# Patient Record
Sex: MALE | URBAN - METROPOLITAN AREA
[De-identification: names, ages, dates, MRNs, and addresses within clinical notes are randomized per-mention and may not be internally consistent; named-entity substitution may affect disease eponyms.]

---

## 2022-08-22 ENCOUNTER — APPOINTMENT (RX ONLY)
Dept: URBAN - METROPOLITAN AREA CLINIC 176 | Facility: CLINIC | Age: 82
Setting detail: DERMATOLOGY
End: 2022-08-22

## 2022-08-22 VITALS — HEIGHT: 71 IN | WEIGHT: 190 LBS

## 2022-08-22 DIAGNOSIS — L43.8 OTHER LICHEN PLANUS: ICD-10-CM

## 2022-08-22 DIAGNOSIS — Z72.0 TOBACCO USE: ICD-10-CM

## 2022-08-22 DIAGNOSIS — D69.2 OTHER NONTHROMBOCYTOPENIC PURPURA: ICD-10-CM

## 2022-08-22 PROCEDURE — ? SMOKING CESSATION COUNSELING

## 2022-08-22 PROCEDURE — 99205 OFFICE O/P NEW HI 60 MIN: CPT

## 2022-08-22 PROCEDURE — ? COUNSELING

## 2022-08-22 ASSESSMENT — LOCATION DETAILED DESCRIPTION DERM
LOCATION DETAILED: LEFT PROXIMAL PRETIBIAL REGION
LOCATION DETAILED: LEFT DISTAL DORSAL FOREARM
LOCATION DETAILED: LEFT DISTAL PRETIBIAL REGION
LOCATION DETAILED: RIGHT PROXIMAL DORSAL FOREARM
LOCATION DETAILED: RIGHT PROXIMAL PRETIBIAL REGION
LOCATION DETAILED: RIGHT DISTAL PRETIBIAL REGION

## 2022-08-22 ASSESSMENT — LOCATION SIMPLE DESCRIPTION DERM
LOCATION SIMPLE: RIGHT PRETIBIAL REGION
LOCATION SIMPLE: LEFT FOREARM
LOCATION SIMPLE: LEFT PRETIBIAL REGION
LOCATION SIMPLE: RIGHT FOREARM

## 2022-08-22 ASSESSMENT — LOCATION ZONE DERM
LOCATION ZONE: ARM
LOCATION ZONE: LEG

## 2022-08-22 NOTE — PROCEDURE: MIPS QUALITY
Detail Level: Generalized
Quality 111:Pneumonia Vaccination Status For Older Adults: Pneumococcal vaccine administered on or after patient’s 60th birthday and before the end of the measurement period
Quality 130: Documentation Of Current Medications In The Medical Record: Current Medications Documented
Quality 110: Preventive Care And Screening: Influenza Immunization: Influenza Immunization not Administered because Patient Refused.

## 2022-08-22 NOTE — PROCEDURE: COUNSELING
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  There is an area of ulceration that the pt reports is due to trauma that has inflammatory rim around it.  In addition, there are some inflammatory papules surrounding several other injuries that could be consistent with LP.  Overall, however, the pt is not bothered by these areas and denies any itch.  I have suggested that he use his clobetasol that he was given by prior derm on any thick areas that occur around trauma until they are flat or if they are itchy.  I have discussed rutin and high dose vit C for his purpura, but emphasized that I do not think we will be able to solve this due to longstanding sun dmg and cutaneous atrophy.  He gets routine labs for MTX, so it is unlikely that he has any plt or bleeding issues due to internal disease, though MTX may contribute slightly perhaps.  He has been on oral steroids in the past, but it isn’t clear for how long or how much this contributed to the current problem.  For him, the issue seems to be largely cosmetic.\\n\\nHe should also f/u if the wound he has fails to heal in 3 months.  It appears to have a healthy granulating base.\\n\\nI also discussed that the pt has several skin cancers likely on his skin today, but he wishes to return to his prior derm to have these evaluated.
Detail Level: Simple
Detail Level: Detailed